# Patient Record
(demographics unavailable — no encounter records)

---

## 2024-10-29 NOTE — HISTORY OF PRESENT ILLNESS
[FreeTextEntry1] : 70 yo male presents for second opinion regarding his hx of BPH.  He is s/p both a TURP and GLPVP in the last year.  He continues to have persistent LUTS (including slow stream and frequency) and penile discomfort.  He also notes dysuria.  He denies any penile discharge and has had no new sexual partners.  His urologist has advised him to proceed with an Aquablation for further treatment.  He states that he had an office cystoscopy 2 months ago which showed obstruction.  he is not sure if he has had the size of his prostate measured.  He is unaware of any recent PSAs that have been sent.  His father had a hx of prostate cancer.    He used to take tamsulosin 0.4 mg nightly, but has not been on it in 8 months after his previous procedure.  He thought that it helped with LUTS when he was taking it.  He is interested in restarting it.

## 2024-10-29 NOTE — PHYSICAL EXAM
[Normal Appearance] : normal appearance [General Appearance - In No Acute Distress] : no acute distress [Heart Rate And Rhythm] : heart rate and rhythm were normal [] : no respiratory distress [Abdomen Soft] : soft [Skin Color & Pigmentation] : normal skin color and pigmentation [Oriented To Time, Place, And Person] : oriented to person, place, and time [Not Anxious] : not anxious [de-identified] : walker

## 2024-10-29 NOTE — ASSESSMENT
[FreeTextEntry1] : 68 yo male with hx of BPH/LUTS s/p 2prior outlet procedures with persistent LUTS.  I requested that he obtain any recent evaluation by his other urologist so that I can better understand the size and anatomy of his bladder outlet.  Aquablation can be an effective bladder outlet procedure the appropriately selected patient.  We also discussed restarting tamsulosin 0.4 mg nightly for LUTS.   Lastly, we discussed sending a Ucx to r/o UTI in the setting of dysuria.  He would like an Rx for abx in case symptoms worse, but will hold off until we have results of his Ucx.  I will send an Rx for cefpodoxime 500 mg BID x 7 days.   We will get a PSA for PCa screening once we have r/o a UTI.